# Patient Record
Sex: MALE | Race: WHITE | NOT HISPANIC OR LATINO | Employment: FULL TIME | URBAN - METROPOLITAN AREA
[De-identification: names, ages, dates, MRNs, and addresses within clinical notes are randomized per-mention and may not be internally consistent; named-entity substitution may affect disease eponyms.]

---

## 2022-06-23 ENCOUNTER — TELEPHONE (OUTPATIENT)
Dept: URGENT CARE | Facility: CLINIC | Age: 22
End: 2022-06-23

## 2022-06-23 PROCEDURE — U0004 COV-19 TEST NON-CDC HGH THRU: HCPCS | Performed by: NURSE PRACTITIONER

## 2022-06-23 PROCEDURE — 87086 URINE CULTURE/COLONY COUNT: CPT | Performed by: NURSE PRACTITIONER

## 2022-06-24 ENCOUNTER — TELEPHONE (OUTPATIENT)
Dept: URGENT CARE | Facility: CLINIC | Age: 22
End: 2022-06-24

## 2022-06-24 NOTE — TELEPHONE ENCOUNTER
----- Message from SONIA Noriega sent at 6/23/2022  7:41 PM EDT -----  Please notify patient of negative COVID test result.

## 2022-06-24 NOTE — TELEPHONE ENCOUNTER
----- Message from SONIA Jorge sent at 6/24/2022  1:20 PM EDT -----  Please call the patient regarding his negative result.

## 2022-07-14 ENCOUNTER — TRANSCRIBE ORDERS (OUTPATIENT)
Dept: LAB | Facility: HOSPITAL | Age: 22
End: 2022-07-14

## 2022-07-14 DIAGNOSIS — H40.053 BILATERAL OCULAR HYPERTENSION: ICD-10-CM

## 2022-07-14 DIAGNOSIS — H43.313 VITREOUS MEMBRANES AND STRANDS, BILATERAL: Primary | ICD-10-CM

## 2022-08-17 ENCOUNTER — LAB (OUTPATIENT)
Dept: LAB | Facility: HOSPITAL | Age: 22
End: 2022-08-17

## 2022-08-17 DIAGNOSIS — H40.053 BILATERAL OCULAR HYPERTENSION: ICD-10-CM

## 2022-08-17 DIAGNOSIS — H43.313 VITREOUS MEMBRANES AND STRANDS, BILATERAL: ICD-10-CM

## 2022-08-17 LAB
CHROMATIN AB SERPL-ACNC: <10 IU/ML (ref 0–14)
T PALLIDUM IGG SER QL: NORMAL

## 2022-08-17 PROCEDURE — 36415 COLL VENOUS BLD VENIPUNCTURE: CPT

## 2022-08-17 PROCEDURE — 86038 ANTINUCLEAR ANTIBODIES: CPT

## 2022-08-17 PROCEDURE — 86431 RHEUMATOID FACTOR QUANT: CPT

## 2022-08-17 PROCEDURE — 82164 ANGIOTENSIN I ENZYME TEST: CPT

## 2022-08-17 PROCEDURE — 85549 MURAMIDASE: CPT

## 2022-08-17 PROCEDURE — 86480 TB TEST CELL IMMUN MEASURE: CPT

## 2022-08-17 PROCEDURE — 86225 DNA ANTIBODY NATIVE: CPT

## 2022-08-17 PROCEDURE — 86780 TREPONEMA PALLIDUM: CPT

## 2022-08-18 LAB
ACE SERPL-CCNC: 17 U/L (ref 14–82)
DSDNA IGG SERPL IA-ACNC: NEGATIVE [IU]/ML
NUCLEAR IGG SER IA-RTO: NEGATIVE

## 2022-08-19 LAB
GAMMA INTERFERON BACKGROUND BLD IA-ACNC: 0.02 IU/ML
LYSOZYME SERPL-MCNC: 5.9 UG/ML (ref 3–12.8)
M TB IFN-G BLD-IMP: NEGATIVE
M TB IFN-G CD4+ BCKGRND COR BLD-ACNC: 0.01 IU/ML
M TB IFN-G CD4+CD8+ BCKGRND COR BLD-ACNC: 0.01 IU/ML
MITOGEN IGNF BLD-ACNC: 1.97 IU/ML
QUANTIFERON INCUBATION: NORMAL
SERVICE CMNT-IMP: NORMAL